# Patient Record
Sex: FEMALE | Race: WHITE
[De-identification: names, ages, dates, MRNs, and addresses within clinical notes are randomized per-mention and may not be internally consistent; named-entity substitution may affect disease eponyms.]

---

## 2020-01-16 ENCOUNTER — HOSPITAL ENCOUNTER (EMERGENCY)
Dept: HOSPITAL 8 - ED | Age: 20
Discharge: HOME | End: 2020-01-16
Payer: COMMERCIAL

## 2020-01-16 VITALS — SYSTOLIC BLOOD PRESSURE: 138 MMHG | DIASTOLIC BLOOD PRESSURE: 79 MMHG

## 2020-01-16 VITALS — HEIGHT: 68 IN | BODY MASS INDEX: 23.56 KG/M2 | WEIGHT: 155.43 LBS
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
## 2020-01-16 DIAGNOSIS — T23.101A: Primary | ICD-10-CM
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
## 2020-01-16 DIAGNOSIS — T31.0: ICD-10-CM
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
## 2020-01-16 PROCEDURE — 99284 EMERGENCY DEPT VISIT MOD MDM: CPT

## 2020-01-16 PROCEDURE — 82962 GLUCOSE BLOOD TEST: CPT

## 2020-01-16 PROCEDURE — 16020 DRESS/DEBRID P-THICK BURN S: CPT

## 2020-01-16 NOTE — NUR
TASK RN: Patient/Caregiver given discharge instructions and they have confirmed 
that they understand the instructions.  Patient ambulatory with steady gait. PT 
LEFT WITH ALL PERSONAL BELONGINGS.